# Patient Record
Sex: MALE | Race: ASIAN | NOT HISPANIC OR LATINO | URBAN - METROPOLITAN AREA
[De-identification: names, ages, dates, MRNs, and addresses within clinical notes are randomized per-mention and may not be internally consistent; named-entity substitution may affect disease eponyms.]

---

## 2020-02-25 ENCOUNTER — EMERGENCY (EMERGENCY)
Facility: HOSPITAL | Age: 35
LOS: 1 days | Discharge: HOME | End: 2020-02-25
Attending: STUDENT IN AN ORGANIZED HEALTH CARE EDUCATION/TRAINING PROGRAM
Payer: SELF-PAY

## 2020-02-25 VITALS
DIASTOLIC BLOOD PRESSURE: 74 MMHG | HEART RATE: 81 BPM | RESPIRATION RATE: 16 BRPM | TEMPERATURE: 98 F | OXYGEN SATURATION: 99 % | SYSTOLIC BLOOD PRESSURE: 114 MMHG

## 2020-02-25 LAB
ALBUMIN SERPL ELPH-MCNC: 4.7 G/DL — SIGNIFICANT CHANGE UP (ref 3.3–5)
ALP SERPL-CCNC: 69 U/L — SIGNIFICANT CHANGE UP (ref 40–120)
ALT FLD-CCNC: 33 U/L — SIGNIFICANT CHANGE UP (ref 4–41)
ANION GAP SERPL CALC-SCNC: 12 MMO/L — SIGNIFICANT CHANGE UP (ref 7–14)
ANISOCYTOSIS BLD QL: SIGNIFICANT CHANGE UP
AST SERPL-CCNC: 30 U/L — SIGNIFICANT CHANGE UP (ref 4–40)
BASOPHILS # BLD AUTO: 0.08 K/UL — SIGNIFICANT CHANGE UP (ref 0–0.2)
BASOPHILS NFR BLD AUTO: 0.9 % — SIGNIFICANT CHANGE UP (ref 0–2)
BASOPHILS NFR SPEC: 1.7 % — SIGNIFICANT CHANGE UP (ref 0–2)
BILIRUB SERPL-MCNC: 0.7 MG/DL — SIGNIFICANT CHANGE UP (ref 0.2–1.2)
BLASTS # FLD: 0 % — SIGNIFICANT CHANGE UP (ref 0–0)
BUN SERPL-MCNC: 10 MG/DL — SIGNIFICANT CHANGE UP (ref 7–23)
CALCIUM SERPL-MCNC: 9.8 MG/DL — SIGNIFICANT CHANGE UP (ref 8.4–10.5)
CHLORIDE SERPL-SCNC: 101 MMOL/L — SIGNIFICANT CHANGE UP (ref 98–107)
CO2 SERPL-SCNC: 25 MMOL/L — SIGNIFICANT CHANGE UP (ref 22–31)
CREAT SERPL-MCNC: 0.93 MG/DL — SIGNIFICANT CHANGE UP (ref 0.5–1.3)
ELLIPTOCYTES BLD QL SMEAR: SLIGHT — SIGNIFICANT CHANGE UP
EOSINOPHIL # BLD AUTO: 0.22 K/UL — SIGNIFICANT CHANGE UP (ref 0–0.5)
EOSINOPHIL NFR BLD AUTO: 2.5 % — SIGNIFICANT CHANGE UP (ref 0–6)
EOSINOPHIL NFR FLD: 4.3 % — SIGNIFICANT CHANGE UP (ref 0–6)
GIANT PLATELETS BLD QL SMEAR: PRESENT — SIGNIFICANT CHANGE UP
GLUCOSE SERPL-MCNC: 88 MG/DL — SIGNIFICANT CHANGE UP (ref 70–99)
HCT VFR BLD CALC: 39.8 % — SIGNIFICANT CHANGE UP (ref 39–50)
HGB BLD-MCNC: 11.8 G/DL — LOW (ref 13–17)
HYPOCHROMIA BLD QL: SIGNIFICANT CHANGE UP
IMM GRANULOCYTES NFR BLD AUTO: 0.2 % — SIGNIFICANT CHANGE UP (ref 0–1.5)
LIDOCAIN IGE QN: 40.1 U/L — SIGNIFICANT CHANGE UP (ref 7–60)
LYMPHOCYTES # BLD AUTO: 2.73 K/UL — SIGNIFICANT CHANGE UP (ref 1–3.3)
LYMPHOCYTES # BLD AUTO: 30.9 % — SIGNIFICANT CHANGE UP (ref 13–44)
LYMPHOCYTES NFR SPEC AUTO: 35.7 % — SIGNIFICANT CHANGE UP (ref 13–44)
MCHC RBC-ENTMCNC: 18.1 PG — LOW (ref 27–34)
MCHC RBC-ENTMCNC: 29.6 % — LOW (ref 32–36)
MCV RBC AUTO: 61.1 FL — LOW (ref 80–100)
METAMYELOCYTES # FLD: 0 % — SIGNIFICANT CHANGE UP (ref 0–1)
MICROCYTES BLD QL: SIGNIFICANT CHANGE UP
MONOCYTES # BLD AUTO: 0.48 K/UL — SIGNIFICANT CHANGE UP (ref 0–0.9)
MONOCYTES NFR BLD AUTO: 5.4 % — SIGNIFICANT CHANGE UP (ref 2–14)
MONOCYTES NFR BLD: 2.6 % — SIGNIFICANT CHANGE UP (ref 2–9)
MYELOCYTES NFR BLD: 0 % — SIGNIFICANT CHANGE UP (ref 0–0)
NEUTROPHIL AB SER-ACNC: 52.2 % — SIGNIFICANT CHANGE UP (ref 43–77)
NEUTROPHILS # BLD AUTO: 5.3 K/UL — SIGNIFICANT CHANGE UP (ref 1.8–7.4)
NEUTROPHILS NFR BLD AUTO: 60.1 % — SIGNIFICANT CHANGE UP (ref 43–77)
NEUTS BAND # BLD: 0 % — SIGNIFICANT CHANGE UP (ref 0–6)
NRBC # FLD: 0 K/UL — SIGNIFICANT CHANGE UP (ref 0–0)
OTHER - HEMATOLOGY %: 0 — SIGNIFICANT CHANGE UP
OVALOCYTES BLD QL SMEAR: SLIGHT — SIGNIFICANT CHANGE UP
PLATELET # BLD AUTO: 257 K/UL — SIGNIFICANT CHANGE UP (ref 150–400)
PLATELET COUNT - ESTIMATE: NORMAL — SIGNIFICANT CHANGE UP
PMV BLD: 10.1 FL — SIGNIFICANT CHANGE UP (ref 7–13)
POIKILOCYTOSIS BLD QL AUTO: SLIGHT — SIGNIFICANT CHANGE UP
POLYCHROMASIA BLD QL SMEAR: SLIGHT — SIGNIFICANT CHANGE UP
POTASSIUM SERPL-MCNC: 4 MMOL/L — SIGNIFICANT CHANGE UP (ref 3.5–5.3)
POTASSIUM SERPL-SCNC: 4 MMOL/L — SIGNIFICANT CHANGE UP (ref 3.5–5.3)
PROMYELOCYTES # FLD: 0 % — SIGNIFICANT CHANGE UP (ref 0–0)
PROT SERPL-MCNC: 8 G/DL — SIGNIFICANT CHANGE UP (ref 6–8.3)
RBC # BLD: 6.51 M/UL — HIGH (ref 4.2–5.8)
RBC # FLD: 18.6 % — HIGH (ref 10.3–14.5)
SODIUM SERPL-SCNC: 138 MMOL/L — SIGNIFICANT CHANGE UP (ref 135–145)
TROPONIN T, HIGH SENSITIVITY: < 6 NG/L — SIGNIFICANT CHANGE UP (ref ?–14)
VARIANT LYMPHS # BLD: 3.5 % — SIGNIFICANT CHANGE UP
WBC # BLD: 8.83 K/UL — SIGNIFICANT CHANGE UP (ref 3.8–10.5)
WBC # FLD AUTO: 8.83 K/UL — SIGNIFICANT CHANGE UP (ref 3.8–10.5)

## 2020-02-25 PROCEDURE — 93010 ELECTROCARDIOGRAM REPORT: CPT

## 2020-02-25 PROCEDURE — 71046 X-RAY EXAM CHEST 2 VIEWS: CPT | Mod: 26

## 2020-02-25 PROCEDURE — 99284 EMERGENCY DEPT VISIT MOD MDM: CPT | Mod: 25

## 2020-02-25 RX ORDER — FAMOTIDINE 10 MG/ML
20 INJECTION INTRAVENOUS ONCE
Refills: 0 | Status: COMPLETED | OUTPATIENT
Start: 2020-02-25 | End: 2020-02-25

## 2020-02-25 RX ADMIN — Medication 30 MILLILITER(S): at 19:07

## 2020-02-25 RX ADMIN — FAMOTIDINE 20 MILLIGRAM(S): 10 INJECTION INTRAVENOUS at 19:07

## 2020-02-25 NOTE — ED ADULT NURSE NOTE - OBJECTIVE STATEMENT
Receive pt in intake alert and oriented x 3  c/o heart burn, neck pain and "burping after eating".  Denies N/V/D, chest pain

## 2020-02-25 NOTE — ED PROVIDER NOTE - OBJECTIVE STATEMENT
33 y/o male with a PMHx of stomach ulcer in November 2019 presents to the ED with c/o burping after every meal.Pt states he has continuous burping after eating, heart burn and pain in his back.  Pt states he was told he had a stomach ulcer in [facility]. Pt denies chest pain, sob. 35 y/o male with a PMHx of duodenal ulcer in November 2019 presents to the ED with c/o burping after every meal. Pt states he has continuous burping after eating, heart burn and pain in his back.  Pt states symptoms are the same and ongoing since february of last year, states that he was initial admitted for a cardiac evaluation March 2019 in [facility] but serial bloodwork was negative for cardiac etiology of symptoms and it was due to the ulcer. States he has been taking medication including abx prescribed from [facility] since november, had 2 endoscopies with residual ulcer still present and recently was started on another antibiotic for presumed H pylori. Denies abd pain, nausea, chest pain, sob, dizziness, but does endorse a 22kg weight loss over the last 6 months. does not know name of his meds here

## 2020-02-25 NOTE — ED PROVIDER NOTE - NS ED ROS FT
Gen: No fever, normal appetite, + weight loss   Eyes: No eye irritation or discharge  ENT: No ear pain, congestion, sore throat  Resp: No cough or trouble breathing  Cardiovascular: No chest pain or palpitation  Gastroenteric: No nausea/vomiting, diarrhea, constipation, + abd pain, + burping   :  No change in urine output; no dysuria  MS: No joint or muscle pain  Skin: No rashes  Neuro: No headache; no abnormal movements  Remainder negative, except as per the HPI

## 2020-02-25 NOTE — ED PROVIDER NOTE - CLINICAL SUMMARY MEDICAL DECISION MAKING FREE TEXT BOX
34M w/ chronic abd complaints, taking unspecified medications from [facility], endorsing weight loss, no chest pain, well appearing, abnormal ekg w/ rbbb, will eval w/ labs, trial symptomatic tx and obtain cxr, likely dc with outpt f/u given chronicity of complaints and extensive outpt GI f/u , will need to be connected to GI here

## 2020-02-25 NOTE — ED PROVIDER NOTE - PHYSICAL EXAMINATION
Gen: AAOx3, NAD  Head: NCAT  ENT: Airway patent, moist mucous membranes, nasal passageways clear, no pharyngeal erythema or exudates, uvula midline, no cervical lymphadenopathy  Cardiac: Normal rate, normal rhythm, no murmurs/rubs/gallops appreciated  Respiratory: Lungs CTA B/L  Gastrointestinal:  Abdomen soft, +mild epigastric ttp,  nondistended, no rebound, no guarding   MSK: No gross abnormalities, FROM of all four extremities, no edema  HEME: Extremities warm, pulses intact and symmetrical in all four extremities  Skin: No rashes, no lesions  Neuro: No gross neurologic deficits

## 2020-02-25 NOTE — ED ADULT NURSE NOTE - CHIEF COMPLAINT QUOTE
Pt states he has continuous burping after eating, heart burn and pain in his back.  Pt states he was told he had a stomach ulcer in [facility]. Pt denies chest pain, sob.

## 2020-02-25 NOTE — ED PROVIDER NOTE - ATTENDING CONTRIBUTION TO CARE
I performed a history and physical exam of the patient and discussed their management with the resident.  I reviewed the resident's note and agree with the documented findings and plan of care except as noted below. My medical decision making and observations are as follows:    34M w/ chronic abd complaints, finished 2 courses of medications for presumed H Pylori (clarithromycin, omeprazole, amoxicillin) from [facility], with persistent belching.  Pt endorses weight loss over past few months.  Denies chest pain or shortness of breath.  Pt is well appearing, heart rrr, lungs cta, abd soft ntnd.  Pt has abnormal ekg w/ rbbb, thus will eval w/ labs, trop, trial symptomatic tx and obtain cxr, likely dc with outpt f/u given chronicity of complaints and extensive outpt GI f/u , will need to be connected to GI here

## 2020-02-25 NOTE — ED PROVIDER NOTE - PATIENT PORTAL LINK FT
You can access the FollowMyHealth Patient Portal offered by [facility] by registering at the following website: [email or web address]. By joining [facility]’s FollowMyHealth portal, you will also be able to view your health information using other applications (apps) compatible with our system.

## 2021-01-31 ENCOUNTER — EMERGENCY (EMERGENCY)
Facility: HOSPITAL | Age: 36
LOS: 1 days | Discharge: HOME | End: 2021-01-31
Attending: EMERGENCY MEDICINE | Admitting: EMERGENCY MEDICINE
Payer: SELF-PAY

## 2021-01-31 VITALS
TEMPERATURE: 98 F | HEART RATE: 80 BPM | OXYGEN SATURATION: 100 % | SYSTOLIC BLOOD PRESSURE: 115 MMHG | RESPIRATION RATE: 16 BRPM | DIASTOLIC BLOOD PRESSURE: 60 MMHG

## 2021-01-31 VITALS
OXYGEN SATURATION: 100 % | SYSTOLIC BLOOD PRESSURE: 120 MMHG | RESPIRATION RATE: 16 BRPM | DIASTOLIC BLOOD PRESSURE: 80 MMHG | TEMPERATURE: 97 F | HEART RATE: 80 BPM

## 2021-01-31 PROBLEM — K26.9 DUODENAL ULCER, UNSPECIFIED AS ACUTE OR CHRONIC, WITHOUT HEMORRHAGE OR PERFORATION: Chronic | Status: ACTIVE | Noted: 2020-02-25

## 2021-01-31 PROCEDURE — 99053 MED SERV 10PM-8AM 24 HR FAC: CPT

## 2021-01-31 PROCEDURE — 99283 EMERGENCY DEPT VISIT LOW MDM: CPT

## 2021-01-31 RX ORDER — MECLIZINE HCL 12.5 MG
1 TABLET ORAL
Qty: 28 | Refills: 0
Start: 2021-01-31 | End: 2021-02-06

## 2021-01-31 NOTE — ED PROVIDER NOTE - PATIENT PORTAL LINK FT
You can access the FollowMyHealth Patient Portal offered by [facility] by registering at the following website: [email or web address]. By joining [facility]’s FollowMyHealth portal, you will also be able to view your health information using other applications (apps) compatible with our system.

## 2021-01-31 NOTE — ED PROVIDER NOTE - NSFOLLOWUPCLINICS_GEN_ALL_ED_FT
[facility] - ENT  Otolaryngology (ENT)  [address] [place] [address]  Phone: [phone]  Fax:   Follow Up Time: Urgent

## 2021-01-31 NOTE — ED PROVIDER NOTE - OBJECTIVE STATEMENT
34 yo M, c/o room-spinning sensation.  Onset:  2 episodes; 1st Friday 1:30-pm; 2nd 11pm tonight; each episode was < 1 hr.  Quality: like being seasick on land.  Associated Sx:  +vomiting when dizziness is at its worse, no HA, no neck pain, No photophobia/phonophobia, no neck, no ataxia, & no fever/chills.    Presently reports that symptoms have resolved

## 2021-01-31 NOTE — ED ADULT NURSE NOTE - OBJECTIVE STATEMENT
35 year old male complaining of dizziness, nausea and vomiting 1 hour after eating spicy food. Pt states after vomiting the dizziness resolved. Pt states  " I feel fine" everything has resolved. Pt denies fever, chills, chest pain, SOB, fatigue, denies blurry vision. sick contacts. Denies blood in vomitus. NSR on cardiac monitor. vss. Neuro WNL. Lungs clear. Pt has a steady gait upon ambulation. Md at bedside assessing pt.

## 2021-01-31 NOTE — ED PROVIDER NOTE - CLINICAL SUMMARY MEDICAL DECISION MAKING FREE TEXT BOX
Impression/Plan:  BPPV.  Patient has no HA, focal weakness/numbness, no slurred speech, no F/C, no neck pain, no CP/SOB, no blurry vision/double vision, no ataxia.   We do not suspect CVA, given that the patient has no risk factors, is a nonsmoker, has no family Hx of CVA, has normal VS, and an unremarkable neurologic exam.  We will prescribe PRN meclizine, and instruct him to f/u with neurology or ENT as an outpatient.  Strict return precautions.

## 2021-01-31 NOTE — ED PROVIDER NOTE - ATTENDING CONTRIBUTION TO CARE
MD Cochran:  patient seen and evaluated with the resident.  I was present for key portions of the History & Physical, and I agree with the Impression & Plan.  MD Cochran:  36 yo M, c/o room-spinning sensation.  Onset:  2 episodes; 1st Friday 1:30-pm; 2nd 11pm tonight.  Quality: like being seasick on land.  Associated Sx:  +vomiting when dizziness is at its worse, no HA, no neck pain, No photophobia/phonophobia, no neck, no ataxia, & no fever/chills.    Presently reports that symptoms have improved.    VS: wnl.  Physical Exam: adult M, well-appearing, NCAT, PERRL, EOMI, Neck - supple, CTA B, RRR, Abd: s/nd/nt, Ext: no edema.  Neuro:  AAOx3, ambulates w/o diff, CN2-12 intact, Gait - normal, normal finger-to-nose, normal heel-to-toe.    Impression/Plan:  BPPV.  Patient has no HA, focal weakness/numbness, no slurred speech, no F/C, no neck pain, no CP/SOB, no blurry vision/double vision, no ataxia.   We do not suspect CVA, given that the patient has no risk factors, is a nonsmoker, has no family Hx of CVA, has normal VS, and an unremarkable neurologic exam.  We will prescribe PRN meclizine, and instruct him to f/u with neurology or ENT as an outpatient.  Strict return precautions. MD Cochran:  patient seen and evaluated with the resident.  I was present for key portions of the History & Physical, and I agree with the Impression & Plan.  MD Cochran:  34 yo M, c/o room-spinning sensation.  Onset:  2 episodes; 1st Friday 1:30-pm; 2nd 11pm tonight; each episode was < 1 hr.  Quality: like being seasick on land.  Associated Sx:  +vomiting when dizziness is at its worse, no HA, no neck pain, No photophobia/phonophobia, no neck, no ataxia, & no fever/chills.    Presently reports that symptoms have resolved  VS: wnl.  Physical Exam: adult M, well-appearing, NCAT, PERRL, EOMI, Neck - supple, CTA B, RRR, Abd: s/nd/nt, Ext: no edema.  Neuro:  AAOx3, ambulates w/o diff, CN2-12 intact, Gait - normal, normal finger-to-nose, normal heel-to-toe.    Impression/Plan:  H&P most c/w BPPV.  Patient has no HA, focal weakness/numbness, no slurred speech, no F/C, no neck pain, no CP/SOB, no blurry vision/double vision, no ataxia.   We do not suspect CVA, given that the patient has no risk factors, is a nonsmoker, has no family Hx of CVA, has normal VS, and an unremarkable neurologic exam.  We will prescribe PRN meclizine, and instruct him to f/u with neurology or ENT as an outpatient.  Strict return precautions. MD Cochran:  patient seen and evaluated with the resident.  I was present for key portions of the History & Physical, and I agree with the Impression & Plan.  MD Cochran:  36 yo M, c/o room-spinning sensation.  Onset:  2 episodes; 1st Friday 1:30-pm; 2nd 11pm tonight; each episode was < 1 hr.  Quality: like being seasick on land.  Associated Sx:  +vomiting when dizziness is at its worse, no HA, no neck pain, No photophobia/phonophobia, no neck, no ataxia, & no fever/chills.  Worse: lateral head movement.   Presently reports that symptoms have resolved  VS: wnl.  Physical Exam: adult M, well-appearing, NCAT, PERRL, EOMI, Neck - supple, CTA B, RRR, Abd: s/nd/nt, Ext: no edema.  Neuro:  AAOx3, ambulates w/o diff, CN2-12 intact, Gait - normal, normal finger-to-nose, normal heel-to-toe.    Impression/Plan:  H&P most c/w BPPV.  Patient has no HA, focal weakness/numbness, no slurred speech, no F/C, no neck pain, no CP/SOB, no blurry vision/double vision, no ataxia.   We do not suspect CVA, given that the patient has no risk factors, is a nonsmoker, has no family Hx of CVA, has normal VS, and an unremarkable neurologic exam.  Neither labs nor advanced imaging (CT/CTA/MR) are indicated at this time. We will prescribe PRN meclizine, and instruct him to f/u with neurology or ENT as an outpatient.  Strict return precautions.

## 2021-01-31 NOTE — ED ADULT TRIAGE NOTE - CHIEF COMPLAINT QUOTE
dizziness, nausea starting 15 min ago, 1 episode of vomiting, reports eating spicy food for dinner -- no pmh, no allergies -- EKG in progress

## 2021-12-22 NOTE — ED ADULT NURSE NOTE - CAS EDN DISCHARGE ASSESSMENT
Pharmacy faxed in a request for prior authorization on:    Medication: metaxalone (SKELAXIN) 800 MG tablet  Dosage: 800 mg  Quantity requested:  90  Pharmacy for prescription has been selected.    Initiation of prior authorization needed.      Tizanidine not therapeutic   Alert and oriented to person, place and time

## 2022-02-23 NOTE — ED ADULT NURSE NOTE - NS_SISCREENINGSR_GEN_ALL_ED
Patient with history of severe PAD. US showed occluded right superficial femoral and popliteal arteries with multiple stents   - CTA showed occluded right superficial femoral and popliteal arteries containing multiple overlapping stents with reconstitution in the mid popliteal artery, three-vessel runoff into b/l feet, occluded left superficial femoral artery with reconstitution distally, moderate narrowing in the mid left popliteal artery and moderate narrowing in the proximal left external iliac artery with associated areas of ulcerated plaque  - Pt transferred to [facility] for IR angiogram and angioplasty. Wire recannulization of the SFA/popliteal stent was performed followed by angioplasty on 2/22. There was persistent poor flow through the stent.   - Vascular consulted for RLE bypass  -- will obtain bilateral GSV vein mapping  -- will obtain pre-operative Echo  -- Plan for OR tentatively next week  - c/w ASA & Lipitor Negative
